# Patient Record
Sex: FEMALE | Race: WHITE | Employment: UNEMPLOYED | URBAN - METROPOLITAN AREA
[De-identification: names, ages, dates, MRNs, and addresses within clinical notes are randomized per-mention and may not be internally consistent; named-entity substitution may affect disease eponyms.]

---

## 2017-03-06 ENCOUNTER — HOSPITAL ENCOUNTER (EMERGENCY)
Age: 57
Discharge: HOME | End: 2017-03-06
Attending: EMERGENCY MEDICINE
Payer: MEDICARE

## 2017-03-06 ENCOUNTER — APPOINTMENT (OUTPATIENT)
Dept: GENERAL RADIOLOGY | Age: 57
End: 2017-03-06
Attending: EMERGENCY MEDICINE
Payer: MEDICARE

## 2017-03-06 VITALS
WEIGHT: 117 LBS | OXYGEN SATURATION: 95 % | RESPIRATION RATE: 16 BRPM | TEMPERATURE: 98.5 F | DIASTOLIC BLOOD PRESSURE: 89 MMHG | BODY MASS INDEX: 20.73 KG/M2 | HEART RATE: 98 BPM | HEIGHT: 63 IN | SYSTOLIC BLOOD PRESSURE: 134 MMHG

## 2017-03-06 DIAGNOSIS — S43.402A SPRAIN SHOULDER/ARM, LEFT, INITIAL ENCOUNTER: Primary | ICD-10-CM

## 2017-03-06 PROCEDURE — 99283 EMERGENCY DEPT VISIT LOW MDM: CPT | Performed by: EMERGENCY MEDICINE

## 2017-03-06 PROCEDURE — 73030 X-RAY EXAM OF SHOULDER: CPT

## 2017-03-06 PROCEDURE — L3670 SO ACRO/CLAV CAN WEB PRE OTS: HCPCS

## 2017-03-06 PROCEDURE — 74011250637 HC RX REV CODE- 250/637: Performed by: EMERGENCY MEDICINE

## 2017-03-06 RX ORDER — HYDROCODONE BITARTRATE AND ACETAMINOPHEN 7.5; 325 MG/1; MG/1
1 TABLET ORAL
Qty: 10 TAB | Refills: 0 | Status: SHIPPED | OUTPATIENT
Start: 2017-03-06

## 2017-03-06 RX ORDER — HYDROCODONE BITARTRATE AND ACETAMINOPHEN 7.5; 325 MG/1; MG/1
1 TABLET ORAL
Status: COMPLETED | OUTPATIENT
Start: 2017-03-06 | End: 2017-03-06

## 2017-03-06 RX ORDER — CLOPIDOGREL BISULFATE 75 MG/1
75 TABLET ORAL
COMMUNITY

## 2017-03-06 RX ADMIN — HYDROCODONE BITARTRATE AND ACETAMINOPHEN 1 TABLET: 7.5; 325 TABLET ORAL at 19:27

## 2017-03-07 NOTE — ED NOTES
Xr Shoulder Lt Ap/lat Min 2 V    Result Date: 3/6/2017  THREE-VIEW LEFT SHOULDER: CLINICAL HISTORY:  Moderate left shoulder pain since fall one week ago. COMPARISON:  None. FINDINGS:  No definite fracture, malalignment, or yaima bone destruction is evident. No persistent radiopaque foreign body is seen. There is no significant arthritis. IMPRESSION:  NO ACUTE ABNORMALITY.

## 2017-03-07 NOTE — ED PROVIDER NOTES
HPI Comments: 57-year-old female suffered a fall 1 week ago she was carrying groceries. Arlyn Carp on her left side. Had some mild pain in her shoulder neck and chest.  Chest and neck pain is better but her shoulder continues to bother her, especially with movement. No numbness or weakness. Patient is a 60 y.o. female presenting with fall. The history is provided by the patient. Fall   The accident occurred more than 2 days ago. The fall occurred while standing. She fell from a height of ground level. She landed on hard floor. There was no blood loss. The point of impact was the left shoulder. The pain is moderate. She was ambulatory at the scene. Pertinent negatives include no fever, no numbness, no nausea, no hematuria, no headaches, no extremity weakness, no loss of consciousness, no tingling and no laceration. Past Medical History:   Diagnosis Date    CAD (coronary artery disease)     stents in heart       Past Surgical History:   Procedure Laterality Date    HX  SECTION      HX CHOLECYSTECTOMY      HX GYN      hyst    HX ORTHOPAEDIC      carpal tunnel both hands, 5 weeks, 3 weeks         History reviewed. No pertinent family history. Social History     Social History    Marital status: LEGALLY      Spouse name: N/A    Number of children: N/A    Years of education: N/A     Occupational History    Not on file. Social History Main Topics    Smoking status: Current Every Day Smoker     Packs/day: 1.00    Smokeless tobacco: Not on file      Comment: about to start chantix    Alcohol use No    Drug use: No    Sexual activity: Not on file     Other Topics Concern    Not on file     Social History Narrative         ALLERGIES: Aspirin; Nsaids (non-steroidal anti-inflammatory drug); Pcn [penicillins]; and Ultram [tramadol]    Review of Systems   Constitutional: Negative for fever. Gastrointestinal: Negative for nausea. Genitourinary: Negative for hematuria. Musculoskeletal: Negative for back pain and extremity weakness. Neurological: Negative for tingling, loss of consciousness, weakness, numbness and headaches. Vitals:    03/06/17 1854   BP: (!) 133/93   Pulse: (!) 102   Resp: 17   Temp: 98.5 °F (36.9 °C)   SpO2: 95%   Weight: 53.1 kg (117 lb)   Height: 5' 3\" (1.6 m)            Physical Exam   Constitutional: She appears well-developed and well-nourished. She appears distressed. Neck: Normal range of motion. Neck supple. No spinous process tenderness and no muscular tenderness present. Musculoskeletal:        Left shoulder: She exhibits decreased range of motion, tenderness and bony tenderness. She exhibits no swelling, no deformity, no laceration and normal pulse. Left elbow: Normal.        Left wrist: Normal.        Thoracic back: Normal.   Skin: Skin is warm and dry. No laceration noted. Nursing note and vitals reviewed.        MDM  Number of Diagnoses or Management Options  Diagnosis management comments: Assessment fracture, probability of rotator cuff disease and will need orthopedic referral.       Amount and/or Complexity of Data Reviewed  Tests in the radiology section of CPT®: ordered and reviewed  Independent visualization of images, tracings, or specimens: yes    Risk of Complications, Morbidity, and/or Mortality  Presenting problems: low  Diagnostic procedures: minimal  Management options: low    Patient Progress  Patient progress: stable    ED Course       Procedures

## 2017-03-07 NOTE — DISCHARGE INSTRUCTIONS
Rest.  Heat. Wear sling 3-5 days. Call orthopedist for appointment for recheck. Shoulder Sprain: Care Instructions  Your Care Instructions    A shoulder sprain occurs when you stretch or tear a ligament in your shoulder. Ligaments are tough tissues that connect one bone to another. A sprain can happen during sports, a fall, or projects around the house. Shoulder sprains usually get better with treatment at home. Follow-up care is a key part of your treatment and safety. Be sure to make and go to all appointments, and call your doctor if you are having problems. It's also a good idea to know your test results and keep a list of the medicines you take. How can you care for yourself at home? · Rest and protect your shoulder. Try to stop or reduce any action that causes pain. · If your doctor gave you a sling or immobilizer, wear it as directed. A sling or immobilizer supports your shoulder and may make you more comfortable. · Put ice or a cold pack on your shoulder for 10 to 20 minutes at a time. Try to do this every 1 to 2 hours for the next 3 days (when you are awake) or until the swelling goes down. Put a thin cloth between the ice and your skin. Some doctors suggest alternating between hot and cold. · Be safe with medicines. Read and follow all instructions on the label. ¨ If the doctor gave you a prescription medicine for pain, take it as prescribed. ¨ If you are not taking a prescription pain medicine, ask your doctor if you can take an over-the-counter medicine. · For the first day or two after an injury, avoid things that might increase swelling, such as hot showers, hot tubs, or hot packs. · After 2 or 3 days, if your swelling is gone, apply a heating pad set on low or a warm cloth to your shoulder. This helps keep your shoulder flexible. Some doctors suggest that you go back and forth between hot and cold. Put a thin cloth between the heating pad and your skin.   · Follow your doctor's or physical therapist's directions for exercises. · Return to your usual level of activity slowly. When should you call for help? Call your doctor now or seek immediate medical care if:  · Your pain is worse. · You cannot move your shoulder. · Your arm is cool or pale or changes color below the shoulder. · You have tingling, weakness, or numbness in your arm. Watch closely for changes in your health, and be sure to contact your doctor if:  · You do not get better as expected. Where can you learn more? Go to http://nancy-lora.info/. Enter V744 in the search box to learn more about \"Shoulder Sprain: Care Instructions. \"  Current as of: May 23, 2016  Content Version: 11.1  © 2436-4060 [employer]. Care instructions adapted under license by [employer] (which disclaims liability or warranty for this information). If you have questions about a medical condition or this instruction, always ask your healthcare professional. [place] [address] any warranty or liability for your use of this information.

## 2017-03-07 NOTE — ED NOTES
I have reviewed discharge instructions with the patient. The patient verbalized understanding. 1 prescription provided and sling applied. Pt ambulatory to lobby in no acute distress.   Olen Boast, RN

## 2018-09-06 ENCOUNTER — HOSPITAL ENCOUNTER (EMERGENCY)
Age: 58
Discharge: HOME | End: 2018-09-06
Attending: EMERGENCY MEDICINE
Payer: MEDICARE

## 2018-09-06 ENCOUNTER — APPOINTMENT (OUTPATIENT)
Dept: GENERAL RADIOLOGY | Age: 58
End: 2018-09-06
Attending: EMERGENCY MEDICINE
Payer: MEDICARE

## 2018-09-06 VITALS
HEIGHT: 63 IN | BODY MASS INDEX: 17.72 KG/M2 | HEART RATE: 97 BPM | WEIGHT: 100 LBS | TEMPERATURE: 98.7 F | RESPIRATION RATE: 16 BRPM | DIASTOLIC BLOOD PRESSURE: 79 MMHG | OXYGEN SATURATION: 95 % | SYSTOLIC BLOOD PRESSURE: 123 MMHG

## 2018-09-06 DIAGNOSIS — M79.671 PAIN OF RIGHT HEEL: Primary | ICD-10-CM

## 2018-09-06 PROCEDURE — 73630 X-RAY EXAM OF FOOT: CPT

## 2018-09-06 PROCEDURE — 99283 EMERGENCY DEPT VISIT LOW MDM: CPT | Performed by: EMERGENCY MEDICINE

## 2018-09-06 NOTE — DISCHARGE INSTRUCTIONS
Heel Pain: Care Instructions  Your Care Instructions  You can have heel pain from an injury or from everyday overuse, such as running or walking a lot. Plantar fasciitis is the most common cause of heel pain. In this condition, the bottom of your foot from the front of the heel to the base of the toes is sore and hard to walk on. Your heel can get better with rest, anti-inflammatory pain medicines, and stretching exercises. Follow-up care is a key part of your treatment and safety. Be sure to make and go to all appointments, and call your doctor if you are having problems. It's also a good idea to know your test results and keep a list of the medicines you take. How can you care for yourself at home? · Rest your feet often. Reduce your activity to a level that lets you avoid pain. If possible, do not run or walk on hard surfaces. · Take anti-inflammatory medicines to reduce heel pain. These include ibuprofen (Advil, Motrin) and naproxen (Aleve). Read and follow all instructions on the label. · Put ice or a cold pack on your heel for 10 to 20 minutes at a time. Try to do this every 1 to 2 hours for the next 3 days (when you are awake). Put a thin cloth between the ice and your skin. · If ice isn't helping after 2 or 3 days, try heat, such as a heating pad set on low. · If your doctor says it is okay, try these calf stretches. Tight calf muscles can cause heel pain or make it worse. ¨ Stand about 1 foot from a wall. Place the palms of both hands against the wall at chest level and lean forward against the wall. Put the leg you want to stretch about a step behind your other leg. Keep your back heel on the floor and bend your front knee until you feel a stretch in the back leg. Hold this position for 15 to 30 seconds. Repeat the exercise 2 to 4 times a session. Do 3 to 4 sessions a day. ¨ Sit down on the floor or a mat with your feet stretched in front of you.  Roll up a towel lengthwise, and loop it over the ball of your foot. Holding the towel at both ends, gently pull the towel toward you to stretch your foot. Hold this position for 15 to 30 seconds. Repeat the exercise 2 to 4 times a session. Do 3 to 4 sessions a day. · Wear a night splint if your doctor suggests it. A night splint holds your foot with the toes pointed up. This position gives the bottom of your foot a constant, gentle stretch. · Wear shoes with good arch support. Athletic shoes or shoes with a well-cushioned sole are good choices. · Try a heel lift, heel cup or shoe insert (orthotic) to help cushion your heel. You can buy these at many shoe stores. Use them in both shoes, even if only one foot hurts. · Maintain a healthy weight. This puts less strain on your feet. When should you call for help? Call your doctor now or seek immediate medical care if:    · You have heel pain with fever, redness, or warmth in your heel.     · You have numbness or tingling in your heel.    Watch closely for changes in your health, and be sure to contact your doctor if:    · You cannot put weight on the sore foot.     · Your heel pain lasts more than 2 weeks. Where can you learn more? Go to http://nancy-lora.info/. Enter S299 in the search box to learn more about \"Heel Pain: Care Instructions. \"  Current as of: November 20, 2017  Content Version: 11.7  © 1523-4063 [employer]. Care instructions adapted under license by [employer] (which disclaims liability or warranty for this information). If you have questions about a medical condition or this instruction, always ask your healthcare professional. [place] [address] any warranty or liability for your use of this information.

## 2018-09-06 NOTE — ED PROVIDER NOTES
HPI: 
59 F, here with Right heel pain after jumping rope while bare feet `1 week ago. Pain at heel. Worsened with ambulation. Radiating into the leg. No other recent trauma. ROS No fever, skin rash, weakness tingling, numbness, paleness of the foot Visit Vitals  /79 (BP 1 Location: Right arm, BP Patient Position: At rest)  Pulse 97  Temp 98.7 °F (37.1 °C)  Resp 16  
 Ht 5' 3\" (1.6 m)  Wt 45.4 kg (100 lb)  SpO2 95%  BMI 17.71 kg/m2 Past Medical History:  
Diagnosis Date  CAD (coronary artery disease) stents in heart Past Surgical History:  
Procedure Laterality Date  HX  SECTION    
 HX CHOLECYSTECTOMY  HX GYN    
 hyst  
 HX ORTHOPAEDIC    
 carpal tunnel both hands, 5 weeks, 3 weeks Prior to Admission Medications Prescriptions Last Dose Informant Patient Reported? Taking? HYDROcodone-acetaminophen (NORCO) 7.5-325 mg per tablet   No No  
Sig: Take 1 Tab by mouth every six (6) hours as needed for Pain. Max Daily Amount: 4 Tabs. clopidogrel (PLAVIX) 75 mg tab   Yes No  
Sig: Take 75 mg by mouth. Facility-Administered Medications: None Adult Exam  
General: alert, no acute distress Head: normocephalic, atraumatic ENT:  
Neck:  full range of motion Back: non-tender, full range of motion Musculoskeletal: normal range of motion, normal strength, no gross deformities 
ttenderness to palpation at the base of the right foot at the heel. No obvious significant tenderness palpation of the medial, lateral malleolus  Of the right ankle Dorsalis pedis pulses intact. No bruising Neurological: no gross focal deficits; normal speech Psychiatric: cooperative; appropriate mood and affect MDM: x-ray without acute fracture. Low suspicion for ankle fracture based on the evaluation. Likely contusion, ligamentous injury. We'll place in postop shoes,  Crutches, ambulatory as tolerated and follow-up with orthopedics. ED Course Xr Foot Rt Min 3 V Result Date: 9/6/2018 Three-view right Foot x-ray dated September 6, 2018 Reference Exam: None Indication: Heel pain one week after jumping rope Findings: 3 images are presented. No fracture nor dislocation nor foreign body is seen. Impression:  Normal foot x-rays if a stress related injury is suspected MRI or nuclear medicine study should be considered. No results found. No results found for this or any previous visit (from the past 24 hour(s)). Dragon voice recognition software was used to create this note. Although the note has been reviewed and corrected where necessary, additional errors may have been overlooked and remain in the text.

## 2018-09-06 NOTE — ED NOTES
I have reviewed discharge instructions with the patient. The patient verbalized understanding. Patient left ED via Discharge Method: ambulatory to Home with  friend). Opportunity for questions and clarification provided. Patient given 0 scripts. To continue your aftercare when you leave the hospital, you may receive an automated call from our care team to check in on how you are doing. This is a free service and part of our promise to provide the best care and service to meet your aftercare needs.  If you have questions, or wish to unsubscribe from this service please call [phone]. Thank you for Choosing our [facility] Emergency Department.

## 2018-09-08 ENCOUNTER — HOSPITAL ENCOUNTER (EMERGENCY)
Age: 58
Discharge: HOME | End: 2018-09-08
Attending: EMERGENCY MEDICINE
Payer: MEDICARE

## 2018-09-08 ENCOUNTER — APPOINTMENT (OUTPATIENT)
Dept: GENERAL RADIOLOGY | Age: 58
End: 2018-09-08
Attending: NURSE PRACTITIONER
Payer: MEDICARE

## 2018-09-08 VITALS
RESPIRATION RATE: 18 BRPM | SYSTOLIC BLOOD PRESSURE: 110 MMHG | OXYGEN SATURATION: 94 % | HEART RATE: 98 BPM | DIASTOLIC BLOOD PRESSURE: 73 MMHG | TEMPERATURE: 98 F

## 2018-09-08 DIAGNOSIS — M79.671 PAIN OF RIGHT HEEL: Primary | ICD-10-CM

## 2018-09-08 PROCEDURE — 99283 EMERGENCY DEPT VISIT LOW MDM: CPT | Performed by: NURSE PRACTITIONER

## 2018-09-08 PROCEDURE — 75810000053 HC SPLINT APPLICATION: Performed by: NURSE PRACTITIONER

## 2018-09-08 PROCEDURE — 73650 X-RAY EXAM OF HEEL: CPT

## 2018-09-08 PROCEDURE — 74011250637 HC RX REV CODE- 250/637: Performed by: NURSE PRACTITIONER

## 2018-09-08 RX ORDER — PROMETHAZINE HYDROCHLORIDE 25 MG/1
25 TABLET ORAL
Qty: 12 TAB | Refills: 0 | Status: SHIPPED | OUTPATIENT
Start: 2018-09-08

## 2018-09-08 RX ORDER — TRAMADOL HYDROCHLORIDE 50 MG/1
50 TABLET ORAL
Qty: 15 TAB | Refills: 0 | Status: SHIPPED | OUTPATIENT
Start: 2018-09-08

## 2018-09-08 RX ORDER — HYDROCODONE BITARTRATE AND ACETAMINOPHEN 5; 325 MG/1; MG/1
1 TABLET ORAL
Status: COMPLETED | OUTPATIENT
Start: 2018-09-08 | End: 2018-09-08

## 2018-09-08 RX ADMIN — HYDROCODONE BITARTRATE AND ACETAMINOPHEN 1 TABLET: 5; 325 TABLET ORAL at 10:48

## 2018-09-08 NOTE — ED NOTES
Ankle splint applied, patient tolerated well I have reviewed discharge instructions with the patient. The patient verbalized understanding. Patient left ED via Discharge Method: wheelchair to Home with family Opportunity for questions and clarification provided. Patient given 2 scripts. To continue your aftercare when you leave the hospital, you may receive an automated call from our care team to check in on how you are doing. This is a free service and part of our promise to provide the best care and service to meet your aftercare needs.  If you have questions, or wish to unsubscribe from this service please call [phone]. Thank you for Choosing our [facility] Emergency Department.

## 2018-09-08 NOTE — ED PROVIDER NOTES
HPI Comments: Patient presents with pain to her right heel. She states pain has been present for the past week. Patient states she was seen here last week and referred to orthopedics. Patient states she has follow up appointment with ortho next Wednesday. Patient states she has tried over the counter ibuprofen without relief. Patient states she has been using her crutches. She denies new injury. Patient is a 59 y.o. female presenting with foot pain. The history is provided by the patient. Foot Pain This is a new problem. The current episode started more than 2 days ago. The problem occurs constantly. The problem has been gradually worsening. The pain is present in the right foot. The quality of the pain is described as aching. The pain is at a severity of 9/10. The pain is moderate. Pertinent negatives include no numbness, full range of motion, no stiffness, no tingling, no itching, no back pain and no neck pain. The symptoms are aggravated by palpation and standing. She has tried OTC pain medications for the symptoms. The treatment provided no relief. Past Medical History:  
Diagnosis Date  CAD (coronary artery disease) stents in heart Past Surgical History:  
Procedure Laterality Date  HX  SECTION    
 HX CHOLECYSTECTOMY  HX GYN    
 hyst  
 HX ORTHOPAEDIC    
 carpal tunnel both hands, 5 weeks, 3 weeks History reviewed. No pertinent family history. Social History Social History  Marital status: LEGALLY  Spouse name: N/A  
 Number of children: N/A  
 Years of education: N/A Occupational History  Not on file. Social History Main Topics  Smoking status: Current Every Day Smoker Packs/day: 1.00  Smokeless tobacco: Not on file Comment: about to start chantix  Alcohol use No  
 Drug use: No  
 Sexual activity: Not on file Other Topics Concern  Not on file Social History Narrative ALLERGIES: Aspirin; Nsaids (non-steroidal anti-inflammatory drug); Pcn [penicillins]; and Ultram [tramadol] Review of Systems Constitutional: Negative for chills and fever. Musculoskeletal: Positive for arthralgias and joint swelling. Negative for back pain, neck pain and stiffness. Skin: Negative for itching. Neurological: Negative for tingling and numbness. Vitals:  
 09/08/18 1017 BP: 110/73 Pulse: 98 Resp: 18 Temp: 98 °F (36.7 °C) SpO2: 94% Physical Exam  
Constitutional: She appears well-developed and well-nourished. No distress. Cardiovascular: Normal rate and regular rhythm. No murmur heard. Pulmonary/Chest: Effort normal and breath sounds normal. No respiratory distress. Musculoskeletal:  
     Right ankle: She exhibits normal range of motion and normal pulse. Achilles tendon exhibits no pain and normal Rivas's test results. Right foot: There is bony tenderness. There is normal range of motion, no swelling, normal capillary refill and no laceration. Neurological: She is alert. Skin: Skin is warm and dry. She is not diaphoretic. No erythema. Psychiatric: She has a normal mood and affect. Nursing note and vitals reviewed. Xr Foot Rt Min 3 V Result Date: 9/6/2018 Three-view right Foot x-ray dated September 6, 2018 Reference Exam: None Indication: Heel pain one week after jumping rope Findings: 3 images are presented. No fracture nor dislocation nor foreign body is seen. Impression:  Normal foot x-rays if a stress related injury is suspected MRI or nuclear medicine study should be considered. Xr Calcaneus Rt Result Date: 9/8/2018 Right calcaneus, INDICATION: trauma and pain AP and lateral views of the right calcaneus were obtained. Calcaneus is intact. No fracture is seen. There are no acute bony abnormality. IMPRESSION: Negative right calcaneus MDM Number of Diagnoses or Management Options Pain of right heel: Diagnosis management comments: Xray negative for acute changes. Patient given po norco and ankle air splint applied. Patient given prescription for tramadol and phenergan. Amount and/or Complexity of Data Reviewed Tests in the radiology section of CPT®: ordered and reviewed Tests in the medicine section of CPT®: ordered and reviewed Patient Progress Patient progress: stable ED Course Procedures

## 2018-09-08 NOTE — DISCHARGE INSTRUCTIONS
Heel Pain: Care Instructions  Your Care Instructions  You can have heel pain from an injury or from everyday overuse, such as running or walking a lot. Plantar fasciitis is the most common cause of heel pain. In this condition, the bottom of your foot from the front of the heel to the base of the toes is sore and hard to walk on. Your heel can get better with rest, anti-inflammatory pain medicines, and stretching exercises. Follow-up care is a key part of your treatment and safety. Be sure to make and go to all appointments, and call your doctor if you are having problems. It's also a good idea to know your test results and keep a list of the medicines you take. How can you care for yourself at home? · Rest your feet often. Reduce your activity to a level that lets you avoid pain. If possible, do not run or walk on hard surfaces. · Take anti-inflammatory medicines to reduce heel pain. These include ibuprofen (Advil, Motrin) and naproxen (Aleve). Read and follow all instructions on the label. · Put ice or a cold pack on your heel for 10 to 20 minutes at a time. Try to do this every 1 to 2 hours for the next 3 days (when you are awake). Put a thin cloth between the ice and your skin. · If ice isn't helping after 2 or 3 days, try heat, such as a heating pad set on low. · If your doctor says it is okay, try these calf stretches. Tight calf muscles can cause heel pain or make it worse. ¨ Stand about 1 foot from a wall. Place the palms of both hands against the wall at chest level and lean forward against the wall. Put the leg you want to stretch about a step behind your other leg. Keep your back heel on the floor and bend your front knee until you feel a stretch in the back leg. Hold this position for 15 to 30 seconds. Repeat the exercise 2 to 4 times a session. Do 3 to 4 sessions a day. ¨ Sit down on the floor or a mat with your feet stretched in front of you.  Roll up a towel lengthwise, and loop it over the ball of your foot. Holding the towel at both ends, gently pull the towel toward you to stretch your foot. Hold this position for 15 to 30 seconds. Repeat the exercise 2 to 4 times a session. Do 3 to 4 sessions a day. · Wear a night splint if your doctor suggests it. A night splint holds your foot with the toes pointed up. This position gives the bottom of your foot a constant, gentle stretch. · Wear shoes with good arch support. Athletic shoes or shoes with a well-cushioned sole are good choices. · Try a heel lift, heel cup or shoe insert (orthotic) to help cushion your heel. You can buy these at many shoe stores. Use them in both shoes, even if only one foot hurts. · Maintain a healthy weight. This puts less strain on your feet. When should you call for help? Call your doctor now or seek immediate medical care if:    · You have heel pain with fever, redness, or warmth in your heel.     · You have numbness or tingling in your heel.    Watch closely for changes in your health, and be sure to contact your doctor if:    · You cannot put weight on the sore foot.     · Your heel pain lasts more than 2 weeks. Where can you learn more? Go to http://nancy-lora.info/. Enter S299 in the search box to learn more about \"Heel Pain: Care Instructions. \"  Current as of: November 20, 2017  Content Version: 11.7  © 9973-5093 [employer]. Care instructions adapted under license by [employer] (which disclaims liability or warranty for this information). If you have questions about a medical condition or this instruction, always ask your healthcare professional. [place] [address] any warranty or liability for your use of this information.

## 2018-09-08 NOTE — ED TRIAGE NOTES
Pt reports injuring her foot over a week ago, was seen here for it, using crutches as home. Pt sees ortho Monday but states the pain is too much.

## 2019-08-21 NOTE — Clinical Note
Take Motrin 400 mg every 6-8 hours for pain. Elevate the foot. Ice to the area. follow-up with orthopedics if not improved in one week. Detail Level: Detailed